# Patient Record
Sex: MALE | Race: WHITE | NOT HISPANIC OR LATINO | Employment: FULL TIME | URBAN - NONMETROPOLITAN AREA
[De-identification: names, ages, dates, MRNs, and addresses within clinical notes are randomized per-mention and may not be internally consistent; named-entity substitution may affect disease eponyms.]

---

## 2019-08-16 ENCOUNTER — LAB REQUISITION (OUTPATIENT)
Dept: LAB | Facility: HOSPITAL | Age: 39
End: 2019-08-16

## 2019-08-16 ENCOUNTER — OFFICE VISIT (OUTPATIENT)
Dept: WOUND CARE | Facility: HOSPITAL | Age: 39
End: 2019-08-16

## 2019-08-16 DIAGNOSIS — Z00.00 ENCOUNTER FOR GENERAL ADULT MEDICAL EXAMINATION WITHOUT ABNORMAL FINDINGS: ICD-10-CM

## 2019-08-16 PROCEDURE — 87205 SMEAR GRAM STAIN: CPT | Performed by: NURSE PRACTITIONER

## 2019-08-16 PROCEDURE — 87176 TISSUE HOMOGENIZATION CULTR: CPT | Performed by: NURSE PRACTITIONER

## 2019-08-16 PROCEDURE — 87070 CULTURE OTHR SPECIMN AEROBIC: CPT | Performed by: NURSE PRACTITIONER

## 2019-08-16 PROCEDURE — 87075 CULTR BACTERIA EXCEPT BLOOD: CPT | Performed by: NURSE PRACTITIONER

## 2019-09-05 LAB
BACTERIA SPEC AEROBE CULT: NORMAL
BACTERIA SPEC ANAEROBE CULT: NORMAL
GRAM STN SPEC: NORMAL
GRAM STN SPEC: NORMAL

## 2020-05-26 PROCEDURE — 87635 SARS-COV-2 COVID-19 AMP PRB: CPT | Performed by: NURSE PRACTITIONER

## 2020-05-27 ENCOUNTER — TELEPHONE (OUTPATIENT)
Dept: URGENT CARE | Facility: CLINIC | Age: 40
End: 2020-05-27

## 2020-05-27 NOTE — TELEPHONE ENCOUNTER
Spoke with patient and discussed his negative Covid-19 results. He verbalized an understanding about test results, and if symptoms appear, get worse, or do not resolve, he agrees to seek follow-up medical care.    COVID-19 Test Result   Telephone Encounter    Patient Name: Shahid Alarcon   : 1980   MRN: [id number]     COVID19   Date Value Ref Range Status   2020 Not Detected Not Detected - Ref. Range Final        Patient was counseled as follows:  • (-) negative COVID-19 test result with or without symptoms   • The test is not perfect, so there is a chance it could be falsely negative or the virus level is too low for detection due to being very early in the infectious process.   • The optimal duration of home isolation is uncertain. The [place] Centers for Disease Control and Prevention (CDC) has issued recommendations on discontinuation of home isolation.   • For this reason, Shahid is strongly encouraged to practice the safest standards in protecting their health and others given the current pandemic concerns. He is advised to:   o Practice social distancing in the community by staying at least 6 feet away from people   o Encouraged to use face mask while out in public   o Continue to wash their hands frequently with soap and hot water, and cover their mouth while coughing.   • If Shahid is asymptomatic, he should self isolate for a total of 14 days from time of potential contact with Covid-19.   • If Shahid is symptomatic then he may discontinue home isolation when the following criteria are met:   o At least seven days have passed since symptoms first appeared AND   o At least three days (72 hours) have passed since recovery of symptoms (defined as resolution of fever without the use of fever-reducing medications and improvement in respiratory symptoms [e.g., cough, shortness of breath])   • If Shahid has been asymptomatic but then develops non-emergent symptoms such as mild increased shortness of  breath, fever, cough, or for other questions, he  was asked to please call their primary care physician’s office or the [place] hotline at [phone].   · Questions were engaged and answered to the best of my ability. He         expressed verbal understanding of their test results and my advice.    Primary Care Physician verified as being: Juan Barnes MD      Electronically signed by LARISSA Loredo, 05/27/20, 6:57 AM.

## 2022-04-21 NOTE — PROGRESS NOTES
"Subjective    Mr. Alarcon is 41 y.o. male    Chief Complaint: Vasectomy Consult    History of Present Illness  41-year-old male requesting elective vasectomy for permanent sterilization.  He has 4 children and desires no more.  He did have left varicocelectomy as a teenager but generally has no scrotal contents pain and has no current pain.    The following portions of the patient's history were reviewed and updated as appropriate: allergies, current medications, past family history, past medical history, past social history, past surgical history and problem list.    Review of Systems    No current outpatient medications on file.    Past Medical History:   Diagnosis Date   • Lesion of skin of cheek    • Pigmented skin lesion        Past Surgical History:   Procedure Laterality Date   • EXCISION LESION     • HERNIA REPAIR     • OTHER SURGICAL HISTORY      Rectocele Repair       Social History     Socioeconomic History   • Marital status:    Tobacco Use   • Smoking status: Never Smoker   • Smokeless tobacco: Never Used   Substance and Sexual Activity   • Alcohol use: Yes     Comment: Current some day       History reviewed. No pertinent family history.    Objective    Temp 97.6 °F (36.4 °C)   Ht 190.5 cm (75\")   Wt 95.3 kg (210 lb)   BMI 26.25 kg/m²     Physical Exam  Penis and testicles are normal.  Small left varicocele present.  Well-healed left inguinal scar present.  Vasa are palpable bilaterally.  No tenderness to palpation.      Results for orders placed or performed during the hospital encounter of 05/26/20   COVID-19, [facility] IN-HOUSE, NP SWAB IN TRANSPORT MEDIA 8-10 HR TAT - Swab, Oropharynx    Specimen: Oropharynx; Swab   Result Value Ref Range    COVID19 Not Detected Not Detected - Ref. Range     Assessment and Plan    Diagnoses and all orders for this visit:    1. Encounter for other contraceptive management (Primary)  -     Vasectomy; Future        We spent time today discussing elective nature " of vasectomy including the risks, benefits, and alternatives.  We discussed risk for infection, bleeding, need for additional procedures, loss of testicle, chronic pain, failure procedure, need to continue back of her current birth control method until sterility is confirmed.  He voiced understanding and provided informed consent to proceed in the clinic in the next few weeks.      This document has been signed by LAYO Fisher MD on April 28, 2022 18:09 CDT

## 2022-04-28 ENCOUNTER — OFFICE VISIT (OUTPATIENT)
Dept: UROLOGY | Facility: CLINIC | Age: 42
End: 2022-04-28

## 2022-04-28 VITALS — WEIGHT: 210 LBS | HEIGHT: 75 IN | TEMPERATURE: 97.6 F | BODY MASS INDEX: 26.11 KG/M2

## 2022-04-28 DIAGNOSIS — Z30.8 ENCOUNTER FOR OTHER CONTRACEPTIVE MANAGEMENT: Primary | ICD-10-CM

## 2022-04-28 PROCEDURE — 99203 OFFICE O/P NEW LOW 30 MIN: CPT | Performed by: UROLOGY

## 2022-05-16 ENCOUNTER — PROCEDURE VISIT (OUTPATIENT)
Dept: UROLOGY | Facility: CLINIC | Age: 42
End: 2022-05-16

## 2022-05-16 DIAGNOSIS — Z30.2 STERILIZATION: Primary | ICD-10-CM

## 2022-05-16 PROCEDURE — 55250 REMOVAL OF SPERM DUCT(S): CPT | Performed by: UROLOGY

## 2022-05-16 RX ORDER — ALPRAZOLAM 1 MG/1
TABLET ORAL
Qty: 1 TABLET | Refills: 0 | Status: SHIPPED | OUTPATIENT
Start: 2022-05-16

## 2022-05-16 RX ORDER — NAPROXEN 500 MG/1
500 TABLET ORAL 2 TIMES DAILY WITH MEALS
Qty: 60 TABLET | Refills: 0 | Status: SHIPPED | OUTPATIENT
Start: 2022-05-16

## 2022-05-16 RX ORDER — HYDROCODONE BITARTRATE AND ACETAMINOPHEN 5; 325 MG/1; MG/1
1 TABLET ORAL EVERY 6 HOURS PRN
Qty: 12 TABLET | Refills: 0 | Status: SHIPPED | OUTPATIENT
Start: 2022-05-16

## 2022-05-16 NOTE — PROGRESS NOTES
CC: I am here to have a vasectomy    Vasectomy procedure note  Patient has been premedicated with alprazolam and Norco.  He has done the appropriate shave the day before the procedure.  He is placed in the supine position.  The usual prep and drape with Betadine is carried out.  The vas is palpated on the right side and  from the remainder of the cord bluntly and pulled just underneath the skin.  1% lidocaine is infiltrated in the subcutaneous tissue.  An incision is made directly onto the vas.  The perivasal tissue was bluntly stripped away from the vas deferens freeing an approximate 2 cm segment.  Titanium clips were placed both proximally and distally and the intervening segment excised.  The specimen,is discarded.  The ends are fulgurated with electrocautery as well as any vessels.    The left-sided vasectomy was carried out the same as the right with no change in findings or technique.  The wound was irrigated with sterile saline.  Any subcutaneous vessels that are bleeding are fulgurated.  The skin is closed with a running 3-0 chromic suture.    The patient tolerated this procedure well.  Postoperative instructions were given.  He is reminded that we will need to see  a sample after approximately 20-25 ejaculations to determine whether or not he has had a successful vasectomy.  He is encouraged to use birth control up until that time.    Marcos Fisher MD  5/16/2022  12:45 CDT

## 2022-07-22 ENCOUNTER — LAB (OUTPATIENT)
Dept: LAB | Facility: HOSPITAL | Age: 42
End: 2022-07-22

## 2022-07-22 ENCOUNTER — TELEPHONE (OUTPATIENT)
Dept: UROLOGY | Facility: CLINIC | Age: 42
End: 2022-07-22

## 2022-07-22 DIAGNOSIS — Z98.52 VASECTOMY STATUS: Primary | ICD-10-CM

## 2022-07-22 DIAGNOSIS — Z30.2 STERILIZATION: ICD-10-CM

## 2022-07-22 LAB
MOTILITY - POST VASECTOMY: ABNORMAL
SPERM - POST VASECTOMY: ABNORMAL

## 2022-07-22 PROCEDURE — 89321 SEMEN ANAL SPERM DETECTION: CPT

## 2022-07-22 NOTE — TELEPHONE ENCOUNTER
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  He admitted he has not ejaculated at least 20 times since his procedure as instructed.  I recommended repeat semen analysis in 4 to 8 weeks after an additional 20 ejaculations in order to ensure azoospermia.  He voiced understanding and agreement.

## 2023-09-18 NOTE — PROGRESS NOTES
Subjective    Mr. Alarcon is 43 y.o. male    Chief Complaint: Scrotal Pain/ Prostate Check     History of Present Illness  Patient is a 41-year-old gentleman who is here for new problem he had a previous vasectomy firstly he has had this past month some mild intermittent discomfort involving the left side of the scrotum it comes and goes and sometimes will radiate up his groin and down his thigh.  There is some mild pain with ejaculation.  He has not noticed any swelling or blood in the ejaculate.  Denies any injury or trauma to the scrotum.  He has not had any scrotal imaging.  Also he would like a prostate check as well as PSA is screening.  Family history of prostate cancer.  Denies any worsening or new voiding symptoms or complaints.  The following portions of the patient's history were reviewed and updated as appropriate: allergies, current medications, past family history, past medical history, past social history, past surgical history and problem list.    Review of Systems   Constitutional:  Negative for chills and fever.   Gastrointestinal:  Negative for abdominal pain, anal bleeding and blood in stool.   Genitourinary:  Positive for testicular pain. Negative for dysuria and hematuria.       Current Outpatient Medications:     albendazole (ALBENZA) 200 MG tablet, TAKE TWO TABLETS BY MOUTH DAILY FOR 3 DAYS (Patient not taking: Reported on 9/21/2023), Disp: , Rfl:     ALPRAZolam (Xanax) 1 MG tablet, 1 tab by mouth 30 minutes prior to vasectomy (Patient not taking: Reported on 9/21/2023), Disp: 1 tablet, Rfl: 0    HYDROcodone-acetaminophen (NORCO) 5-325 MG per tablet, Take 1 tablet by mouth Every 6 (Six) Hours As Needed for Severe Pain  (postop pain). (Patient not taking: Reported on 9/21/2023), Disp: 12 tablet, Rfl: 0    naproxen (Naprosyn) 500 MG tablet, Take 1 tablet by mouth 2 (Two) Times a Day With Meals. (Patient not taking: Reported on 9/21/2023), Disp: 60 tablet, Rfl: 0    Past Medical History:  "  Diagnosis Date    Lesion of skin of cheek     Pigmented skin lesion        Past Surgical History:   Procedure Laterality Date    EXCISION LESION      HERNIA REPAIR      OTHER SURGICAL HISTORY      Rectocele Repair       Social History     Socioeconomic History    Marital status:    Tobacco Use    Smoking status: Never    Smokeless tobacco: Never   Substance and Sexual Activity    Alcohol use: Yes     Comment: Current some day       History reviewed. No pertinent family history.    Objective    Temp 97.6 °F (36.4 °C)   Ht 193 cm (76\")   Wt 99.2 kg (218 lb 12.8 oz)   BMI 26.63 kg/m²     Physical Exam  Vitals reviewed.   Constitutional:       Appearance: Normal appearance.   HENT:      Head: Normocephalic and atraumatic.   Pulmonary:      Effort: Pulmonary effort is normal.   Abdominal:      Hernia: There is no hernia in the left inguinal area or right inguinal area.   Genitourinary:     Penis: Normal.       Testes: Normal.      Epididymis:      Right: Normal.      Left: Normal.      Comments: Digital rectal exam revealed a smooth symmetric prostate appropriate for age no suspicious lesions nodules asymmetry or firmness were palpated.  External genitalia exam was essentially normal some mild tenderness with palpation above the left epididymis.  Skin:     General: Skin is warm and dry.   Neurological:      Mental Status: He is alert.   Psychiatric:         Mood and Affect: Mood normal.         Behavior: Behavior normal.           Results for orders placed or performed in visit on 09/21/23   POC Urinalysis Dipstick, Multipro    Specimen: Urine   Result Value Ref Range    Color Yellow Yellow, Straw, Dark Yellow, Maric[place]en    Clarity, UA Clear Clear    Glucose, UA Negative Negative mg/dL    Bilirubin Negative Negative    Ketones, UA Negative Negative    Specific Gravity  1.015 1.005 - 1.030    Blood, UA Negative Negative    pH, Urine 7.0 5.0 - 8.0    Protein, POC Negative Negative mg/dL    Urobilinogen, UA 0.2 " E.U./dL Normal, 0.2 E.U./dL    Nitrite, UA Negative Negative    Leukocytes Negative Negative     Assessment and Plan    Diagnoses and all orders for this visit:    1. Scrotal pain (Primary)  -     POC Urinalysis Dipstick, Multipro  -     US Scrotum & Testicles; Future    2. Prostate cancer screening  -     Cancel: PSA Screen; Future  -     PSA Screen; Future  -     PSA Screen    Rectal exam was benign we will get a baseline PSA.  Regarding his scrotal pain which is mild I told him he can take anti-inflammatories as needed I will have him return with a scrotal ultrasound to rule out any internal abnormalities.

## 2023-09-21 ENCOUNTER — OFFICE VISIT (OUTPATIENT)
Dept: UROLOGY | Facility: CLINIC | Age: 43
End: 2023-09-21
Payer: COMMERCIAL

## 2023-09-21 VITALS — TEMPERATURE: 97.6 F | BODY MASS INDEX: 26.65 KG/M2 | HEIGHT: 76 IN | WEIGHT: 218.8 LBS

## 2023-09-21 DIAGNOSIS — N50.82 SCROTAL PAIN: Primary | ICD-10-CM

## 2023-09-21 DIAGNOSIS — Z12.5 PROSTATE CANCER SCREENING: ICD-10-CM

## 2023-09-21 LAB
BILIRUB BLD-MCNC: NEGATIVE MG/DL
CLARITY, POC: CLEAR
COLOR UR: YELLOW
GLUCOSE UR STRIP-MCNC: NEGATIVE MG/DL
KETONES UR QL: NEGATIVE
LEUKOCYTE EST, POC: NEGATIVE
NITRITE UR-MCNC: NEGATIVE MG/ML
PH UR: 7 [PH] (ref 5–8)
PROT UR STRIP-MCNC: NEGATIVE MG/DL
RBC # UR STRIP: NEGATIVE /UL
SP GR UR: 1.01 (ref 1–1.03)
UROBILINOGEN UR QL: NORMAL

## 2023-09-21 RX ORDER — ALBENDAZOLE 200 MG/1
TABLET, FILM COATED ORAL
COMMUNITY
Start: 2023-06-19

## 2023-09-22 ENCOUNTER — TELEPHONE (OUTPATIENT)
Dept: UROLOGY | Facility: CLINIC | Age: 43
End: 2023-09-22
Payer: COMMERCIAL

## 2023-09-22 LAB — PSA SERPL-MCNC: 0.17 NG/ML (ref 0–4)

## 2023-09-22 NOTE — TELEPHONE ENCOUNTER
Called pt with result- left vm to call back   Ok for hub to read     ----- Message from IRIS Sadler sent at 9/22/2023  9:31 AM CDT -----  Regarding: PSA  His PSA was 0.17 which is low and age-appropriate.  ----- Message -----  From: Manuela Garcia MA  Sent: 9/21/2023  10:40 AM CDT  To: IRIS Sadler

## 2023-09-25 ENCOUNTER — TELEPHONE (OUTPATIENT)
Dept: UROLOGY | Facility: CLINIC | Age: 43
End: 2023-09-25

## 2023-09-26 ENCOUNTER — HOSPITAL ENCOUNTER (OUTPATIENT)
Dept: ULTRASOUND IMAGING | Facility: HOSPITAL | Age: 43
Discharge: HOME | End: 2023-09-26
Payer: COMMERCIAL

## 2025-09-05 ENCOUNTER — TRANSCRIBE ORDERS (OUTPATIENT)
Dept: ADMINISTRATIVE | Age: 45
End: 2025-09-05

## 2025-09-05 DIAGNOSIS — M24.621: Primary | ICD-10-CM
